# Patient Record
Sex: FEMALE | URBAN - METROPOLITAN AREA
[De-identification: names, ages, dates, MRNs, and addresses within clinical notes are randomized per-mention and may not be internally consistent; named-entity substitution may affect disease eponyms.]

---

## 2020-07-25 ENCOUNTER — TELEPHONE ENCOUNTER (OUTPATIENT)
Dept: URBAN - METROPOLITAN AREA CLINIC 13 | Facility: CLINIC | Age: 25
End: 2020-07-25

## 2020-07-26 ENCOUNTER — TELEPHONE ENCOUNTER (OUTPATIENT)
Dept: URBAN - METROPOLITAN AREA CLINIC 13 | Facility: CLINIC | Age: 25
End: 2020-07-26

## 2020-10-28 ENCOUNTER — TELEPHONE ENCOUNTER (OUTPATIENT)
Dept: URBAN - METROPOLITAN AREA CLINIC 113 | Facility: CLINIC | Age: 25
End: 2020-10-28

## 2022-10-27 ENCOUNTER — CLAIMS CREATED FROM THE CLAIM WINDOW (OUTPATIENT)
Dept: URBAN - METROPOLITAN AREA CLINIC 113 | Facility: CLINIC | Age: 27
End: 2022-10-27
Payer: COMMERCIAL

## 2022-10-27 ENCOUNTER — WEB ENCOUNTER (OUTPATIENT)
Dept: URBAN - METROPOLITAN AREA CLINIC 113 | Facility: CLINIC | Age: 27
End: 2022-10-27

## 2022-10-27 ENCOUNTER — DASHBOARD ENCOUNTERS (OUTPATIENT)
Age: 27
End: 2022-10-27

## 2022-10-27 VITALS
DIASTOLIC BLOOD PRESSURE: 81 MMHG | HEIGHT: 69 IN | TEMPERATURE: 97.7 F | BODY MASS INDEX: 43.4 KG/M2 | RESPIRATION RATE: 20 BRPM | WEIGHT: 293 LBS | HEART RATE: 78 BPM | SYSTOLIC BLOOD PRESSURE: 135 MMHG

## 2022-10-27 DIAGNOSIS — E28.2 PCOS (POLYCYSTIC OVARIAN SYNDROME): ICD-10-CM

## 2022-10-27 DIAGNOSIS — R74.8 ELEVATED LIVER ENZYMES: ICD-10-CM

## 2022-10-27 DIAGNOSIS — K59.09 CHRONIC CONSTIPATION: ICD-10-CM

## 2022-10-27 DIAGNOSIS — R10.84 ABDOMINAL CRAMPING, GENERALIZED: ICD-10-CM

## 2022-10-27 DIAGNOSIS — R10.30 LOWER ABDOMINAL PAIN: ICD-10-CM

## 2022-10-27 PROBLEM — 237055002: Status: ACTIVE | Noted: 2022-10-27

## 2022-10-27 PROCEDURE — 99244 OFF/OP CNSLTJ NEW/EST MOD 40: CPT

## 2022-10-27 PROCEDURE — 99244 OFF/OP CNSLTJ NEW/EST MOD 40: CPT | Performed by: INTERNAL MEDICINE

## 2022-10-27 PROCEDURE — 99204 OFFICE O/P NEW MOD 45 MIN: CPT | Performed by: INTERNAL MEDICINE

## 2022-10-27 RX ORDER — LEVOTHYROXINE SODIUM 25 UG/1
1 TABLET IN THE MORNING ON AN EMPTY STOMACH TABLET ORAL ONCE A DAY
Status: ACTIVE | COMMUNITY

## 2022-10-27 RX ORDER — DICYCLOMINE HYDROCHLORIDE 10 MG/1
1 CAPSULE CAPSULE ORAL
Qty: 90 | Refills: 1 | OUTPATIENT
Start: 2022-10-27 | End: 2022-12-25

## 2022-10-27 NOTE — HPI-TODAY'S VISIT:
26-year-old female with a history of headaches, vitamin D deficiency, hypothyroidism is referred by Dr. Dale Feldman for evaluation of irritable bowel syndrome.  A copy of today's visit will be forwarded to the referring provider.  Pelvic Doppler ultrasound 1/10/2022: Benign 5.7 cm cyst in the right ovary.  No evidence of right ovarian torsion.  Normal exam of the uterus.  Left ovary is obscured by bowel gas.  CT scan abdomen/pelvis without contrast 1/10/2022:6.0 cm cyst in the right ovary.  Consider further characterization with ultrasound the pelvis.  No evidence of appendicitis or other acute abnormality in the abdomen or pelvis.  Minimal hepatomegaly.  Labs 10/26/2021: Hemoglobin A1c 5.8, normal vitamin D, normal lipid panel, normal vitamin B12, normal folate, normal ferritin, normal iron panel, normal TSH, elevated T3 199, normal T4, , AST 79, hemoglobin 11.8 otherwise unremarkable CBC.  [place] [facility] [place] left-sided abdominal pain occurring for 1 week.  Labs at that time revealed normal CBC, normal CMP with exception of ALT 61, and unremarkable urinalysis with exception of elevated WBC.  Abdominal/pelvis CT scan at that time was unremarkable.  CT scan of the abdomen/pelvis 10/7/2022:The liver, gallbladder, pancreas, spleen and bilateral kidneys are normal.  No bowel wall thickening or dilation present.  Patient states left lower quadrant pain started several weeks ago.  The pain is now shifted to bilateral lower quadrants.  The pain is dull and may worsen when lifting something.  The pain worsens after bowel movement and is more noticeable in the mornings.  She also notices tingling down bilateral legs.  She has been on birth control since December 2020 for PCOS.  She was bleeding for menstruate prior to her diagnosis.  She required 2 blood transfusions.  She did have exploratory surgery recently due to right ovarian cyst.  She states her gynecologist found "gastrointestinal issues" at that time and she was told to follow-up with GI. She did have COVID in July 2020.  She denies any current nausea, vomiting, dysphagia.  Appetite is normal.  She does have alternating bowel habits.  She may have a bowel movement daily though she does often strain.  She has intermittent days of increased stool frequency.  She tells me she would go a week without a bowel movement when she was younger.  She denies blood per rectum or melena.

## 2022-12-27 ENCOUNTER — OFFICE VISIT (OUTPATIENT)
Dept: URBAN - METROPOLITAN AREA CLINIC 113 | Facility: CLINIC | Age: 27
End: 2022-12-27

## 2022-12-27 RX ORDER — LEVOTHYROXINE SODIUM 25 UG/1
1 TABLET IN THE MORNING ON AN EMPTY STOMACH TABLET ORAL ONCE A DAY
Status: ACTIVE | COMMUNITY

## 2022-12-27 NOTE — HPI-TODAY'S VISIT:
26-year-old female with a history of headaches, vitamin D deficiency, hypothyroidism is referred by Dr. Dale Feldman for evaluation of irritable bowel syndrome.  A copy of today's visit will be forwarded to the referring provider.  Pelvic Doppler ultrasound 1/10/2022: Benign 5.7 cm cyst in the right ovary.  No evidence of right ovarian torsion.  Normal exam of the uterus.  Left ovary is obscured by bowel gas.  CT scan abdomen/pelvis without contrast 1/10/2022:6.0 cm cyst in the right ovary.  Consider further characterization with ultrasound the pelvis.  No evidence of appendicitis or other acute abnormality in the abdomen or pelvis.  Minimal hepatomegaly.  Labs 10/26/2021: Hemoglobin A1c 5.8, normal vitamin D, normal lipid panel, normal vitamin B12, normal folate, normal ferritin, normal iron panel, normal TSH, elevated T3 199, normal T4, , AST 79, hemoglobin 11.8 otherwise unremarkable CBC.  Patient presented to the ED at [facility] [place] on 10/7/2022 for left-sided abdominal pain occurring for 1 week.  Labs at that time revealed normal CBC, normal CMP with exception of ALT 61, and unremarkable urinalysis with exception of elevated WBC.  Abdominal/pelvis CT scan at that time was unremarkable.  CT scan of the abdomen/pelvis 10/7/2022:The liver, gallbladder, pancreas, spleen and bilateral kidneys are normal.  No bowel wall thickening or dilation present.  Patient states left lower quadrant pain started several weeks ago.  The pain is now shifted to bilateral lower quadrants.  The pain is dull and may worsen when lifting something.  The pain worsens after bowel movement and is more noticeable in the mornings.  She also notices tingling down bilateral legs.  She has been on birth control since December 2020 for PCOS.  She was bleeding for menstruate prior to her diagnosis.  She required 2 blood transfusions.  She did have exploratory surgery recently due to right ovarian cyst.  She states her gynecologist found "gastrointestinal issues" at that time and she was told to follow-up with GI. She did have COVID in July 2020.  She denies any current nausea, vomiting, dysphagia.  Appetite is normal.  She does have alternating bowel habits.  She may have a bowel movement daily though she does often strain.  She has intermittent days of increased stool frequency.  She tells me she would go a week without a bowel movement when she was younger.  She denies blood per rectum or melena.  Interval history: 27-year-old female presents for follow-up.  She was last seen on 10/27/2022.  She did have mildly elevated liver enzymes suspected to be secondary to nonalcoholic fatty liver disease in the setting of PCOS.  She was planned for additional labs to rule out other causes of chronic liver disease.  She also reported a recent onset of left lower quadrant pain which radiates to bilateral lower quadrants.  ER evaluation in October included unremarkable labs and CT scan.  Differential included functional versus PCOS induced versus constipation induced.  Suspected bowel blockage with overflow diarrhea.  She was recommended MiraLAX with titration to effect and a trial of dicyclomine.  If no improvement we would consider colonoscopy though suspect this would be low yield.  We will obtain op report from gynecology for review. We do not have GYN records for review. We do not have labs for review.